# Patient Record
Sex: MALE | Race: WHITE | URBAN - NONMETROPOLITAN AREA
[De-identification: names, ages, dates, MRNs, and addresses within clinical notes are randomized per-mention and may not be internally consistent; named-entity substitution may affect disease eponyms.]

---

## 2022-05-11 ENCOUNTER — HOSPITAL ENCOUNTER (OUTPATIENT)
Dept: OCCUPATIONAL THERAPY | Age: 57
Setting detail: THERAPIES SERIES
Discharge: HOME | End: 2022-05-11
Payer: COMMERCIAL

## 2022-05-11 PROCEDURE — 97165 OT EVAL LOW COMPLEX 30 MIN: CPT

## 2022-05-11 ASSESSMENT — 9 HOLE PEG TEST
TESTTIME_SECONDS: 26.9
TESTTIME_SECONDS: 32.6

## 2022-05-11 NOTE — PLAN OF CARE
Phone: [address]         Fax: [phone]                       Outpatient Occupational Therapy                                                                         Plan of Care    Date: 2022  Patient: Samantha Yates  : 1965  ACCT #: [de-identified]    [facility]#: [id number]  Referring Provider (secondary): Jace Olszewski, MD    Diagnosis: Parkinson's Disease  Additional Pertinent Hx: Pt recently diagnosed with Parkinson's Disease. States his father passed away from Parkinson's. States noted difficulties with daily tasks secondary to \"shaking\". States shaking has improved with medications but his tremors still cause issues. States he also has recently been put on medication for high blood pressure with the thought that making tremors associated with high blood pressure. (pt on coumadin)  Treatment Diagnosis: Parkinson's Disease    Onset Date: 22  Total # of Visits Approved: 12 Per Physician Order  Total # of Visits to Date: 1  No Show: 0  Canceled Appointment: 0      Objective      Fine Motor Skills  Left 9 Hole Peg Test Time (secs): 32.6  Right 9 Hole Peg Test Time (secs): 26.9       LUE AROM (degrees)  LUE AROM : WFL  Left Hand AROM (degrees)  Left Hand AROM: WFL  RUE AROM (degrees)  RUE AROM : WFL  Right Hand AROM (degrees)  Right Hand AROM: WFL   LUE Strength  Gross LUE Strength: WFL  L Hand General: 4+/5  Left Hand Strength -  (lbs)  Handle Setting 2: 105, 104, 114  L Lat pinch: 18.3# average  L Brantley pinch: 18.3# average  L Tip pinch: 17.6 # average  RUE Strength  Gross RUE Strength: WFL  R Hand General: 4+/5  Right Hand Strength -  (lbs)  Handle Setting 2: 110, 114, 100  R Lat pinch: 21.5# average  R Brantley pinch: 21.5# average  R Tip Pinch: 18.67# average        Assessment  Performance deficits / Impairments: Decreased endurance  Assessment: Pt referred to outpt OT with diagnosis of early onset Parkinson's Disease.  Pt states he has no deficits regarding completion of self care tasks or work duties. Pt's only complaint are the LEONEL UE involuntary tremors that are worse when engaging FM activities such as signing name & writing out checks. 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 Pt demonstrates no LEONEL UB ROM or MMT deficits. Also no /pinch strength deficits identified as indicated above. Plan to see pt for a few sessions to provide education of modified equipment to reduce intensity of tremors during FM tasks.   Prognosis: Fair       PLAN     Plan  Plan Frequency: 1-2x  Plan Weeks: 8 visits    [x] HP/CP      [] Electrical Stimulation   [x]  Strengthening    [x]  Active/Passive ROM  [] Muscle Re-education    [x] Fine Motor Coordination    [x]  Ultrasound   [x]  Splinting  [] Developmental Therapy    [] Sensory Integration [x]  Patient Education/HEP [] Visual Perception Retraining   [x] ADL Training   [] Cognitive Retraining  [] Fluidotherapy  [x] Paraffin   [x] Therapeutic Exercise  [x] Therapeutic Activity  [] Other:    GOALS  Short Term Goals  Time Frame for Short term goals: STG=LTG         Long Term Goals  Time Frame for Long term goals : 8 visits  Long Term Goal 1: Pt to be educated on & trial modified writing utensils in order to engage handwriting tasks such as signing name/writing checks  Long Term Goal 2: Pt to be educated on AE/EC/WS strategies to minimze fatigue & decreased tremors throughout daily tasks  Long Term Goal 3: Pt to be educated on Parkinson's Disease progression and how to maintain independence as symptoms progress               [place] Dow, CLAUDINE, OTR/L                    Date: 5/11/2022      _____________________________________ Date: 5/11/2022  Physician Signature    By signing above or cosigning electronically, I have reviewed this Plan of Care and certify a need for medically necessary rehabilitation services.

## 2022-05-11 NOTE — PROGRESS NOTES
Phone: [address]          Fax: [phone]                       Outpatient Occupational Therapy                                                                            Evaluation    Date: 2022  Patient: Arsenio Severance  : 1965  ACCT#: [de-identified]   Referring Provider (secondary): Armin Morataya MD    Diagnosis: Parkinson's Disease   Additional Pertinent Hx: Pt recently diagnosed with Parkinson's Disease. States his father passed away from Parkinson's. States noted difficulties with daily tasks secondary to \"shaking\". States shaking has improved with medications but his tremors still cause issues.  States he also has recently been put on medication for high blood pressure with the thought that making tremors associated with high blood pressure. (pt on coumadin)  Treatment Diagnosis: Parkinson's Disease  Onset Date: 22  OT Insurance Information: [facility] [place]- 36 visits shared between OT/PT  Total # of Visits Approved: 12 Per Physician Order  Total # of Visits to Date: 1  No Show: 0  Canceled Appointment: 0           Subjective  Hand Dominance: Right  Social/Functional History  ADL Assistance: Independent  Homemaking Assistance: Independent  Homemaking Responsibilities: Yes  Ambulation Assistance: Independent  Transfer Assistance: Independent  Active : Yes  Occupation: [employer] time employment  Type of Occupation: [employer]; pt also farms full time  Leisure & Hobbies: farming  IADL Comments: feeds cattle daily- c/o most about the shaking  IADL History  Homemaking Responsibilities: Yes  Active : Yes  Occupation: [email or web address] time employment  Type of Occupation: [employer]; pt also farms full time  Leisure & Hobbies: farming  IADL Comments: feeds cattle daily- c/o most about the shaking  Prior Function  ADL Assistance: Independent  Homemaking Assistance: Independent  Ambulation Assistance: Independent  Transfer Assistance: Independent  Learning  Does the patient/guardian have any barriers to learning?: No barriers  What is the preferred language of the patient/guardian?: English  Is an  required?: No    Objective         Fine Motor Skills  Left 9 Hole Peg Test Time (secs): 32.6  Right 9 Hole Peg Test Time (secs): 26.9          LUE AROM (degrees)  LUE AROM : WFL  Left Hand AROM (degrees)  Left Hand AROM: WFL  RUE AROM (degrees)  RUE AROM : WFL  Right Hand AROM (degrees)  Right Hand AROM: WFL   LUE Strength  Gross LUE Strength: WFL  L Hand General: 4+/5  Left Hand Strength -  (lbs)  Handle Setting 2: 105, 104, 114  L Lat pinch: 18.3# average  L Brantley pinch: 18.3# average  L Tip pinch: 17.6 # average  RUE Strength  Gross RUE Strength: WFL  R Hand General: 4+/5  Right Hand Strength -  (lbs)  Handle Setting 2: 110, 114, 100  R Lat pinch: 21.5# average  R Brantley pinch: 21.5# average  R Tip Pinch: 18.67# average     Assessment  Performance deficits / Impairments: Decreased endurance  Assessment: Pt referred to outpt OT with diagnosis of early onset Parkinson's Disease. Pt states he has no deficits regarding completion of self care tasks or work duties. Pt's only complaint are the LEONEL UE involuntary tremors that are worse when engaging FM activities such as signing name & writing out checks. 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 Pt demonstrates no LEONEL UB ROM or MMT deficits. Also no /pinch strength deficits identified as indicated above. Plan to see pt for a few sessions to provide education of modified equipment to reduce intensity of tremors during FM tasks.   Prognosis: Fair      Patient's Goal:  Pt stats he has no real concerns regarding ability to care for self or complete farm duties- only complaint is his recent tremors/shaking    Short Term Goals  Time Frame for Short term goals: STG=LTG        Long Term Goals  Time Frame for Long term goals : 8 visits  Long Term Goal 1: Pt to be educated on & trial modified writing utensils in order to engage handwriting tasks such as signing name/writing checks  Long Term Goal 2: Pt to be educated on AE/EC/WS strategies to minimze fatigue & decreased tremors throughout daily tasks  Long Term Goal 3: Pt to be educated on Parkinson's Disease progression and how to maintain independence as symptoms progress           Time In: 845  Time Out: 935  Timed Coded Minutes: 0  Total Treatment Time: 50    Shobha Dow, CLAUDINE, OTR/L  5/11/2022

## 2022-05-12 ENCOUNTER — HOSPITAL ENCOUNTER (OUTPATIENT)
Dept: SPEECH THERAPY | Age: 57
Setting detail: THERAPIES SERIES
Discharge: HOME | End: 2022-05-12
Payer: COMMERCIAL

## 2022-05-12 ENCOUNTER — HOSPITAL ENCOUNTER (OUTPATIENT)
Dept: PHYSICAL THERAPY | Age: 57
Setting detail: THERAPIES SERIES
Discharge: HOME | End: 2022-05-12
Payer: COMMERCIAL

## 2022-05-12 PROCEDURE — 97161 PT EVAL LOW COMPLEX 20 MIN: CPT

## 2022-05-12 PROCEDURE — 92523 SPEECH SOUND LANG COMPREHEN: CPT

## 2022-05-12 NOTE — PROGRESS NOTES
Speech Language Pathology  Facility/Department: [facility] SPEECH THERAPY  Initial Assessment    NAME: Anne-Marie De La Cruz  : 1965  MRN: [id number]    Date of Eval: 2022  Evaluating Therapist: Lindsay Escudero M.S., CCC-SLP      Past Medical History: Parkinsons Disease  Past Surgical History:  has no past surgical history on file. Primary Complaint: Pt recently diagnosed with Parkinson's Disease. No issues reported with speech or language. Pt does report occasionally having trouble recalling things or knowing the current date. Pain: 0/10    Assessment:  Diagnosis: Mild Cognitive Impairment (G31.84)    Subjective:   Previous level of function and limitations: Pt is independent with medication management, financial management, work, cooking, ADL's, and driving. General  Chart Reviewed: Yes  Patient assessed for rehabilitation services?: Yes  Family / Caregiver Present: No  General Comment  Comments: 1 (Total # of visits)  Visit Information  Total # of Visits Approved: 20  Total # of Visits to Date: 1  No Show: 0  Canceled Appointment: 0     Vision  Vision: Impaired  Vision Exceptions: Wears glasses for reading  Hearing  Hearing: Within functional limits       Objective:  Oral/Motor: Labial and lingual ROM and strength appear WFL. Pt with adequate natural dentition. Motor Speech  Pt with speech that is 100% intelligible. No dysarthria or apraxia observed. Receptive Language  Pt with receptive language WFL. Expressive Language  Pt with expressive language WFL. Cognition  Pt with mild deficits noted in working memory and short term memory. Pt reports independent successful use of external compensatory strategies for memory, including alarms, calendar, taking notes/photos of important information. Attention, problem solving, judgement, calculation, convergent/divergent thinking, planning, and reasoning all appear WFL.     Patient tolerated todays evaluation: Good    Treatment Given Today: [x] Evaluation    []Plans/ Goals discussed with pt/family/caregiver(s)                                        [] Risks Benefits discussed with pt/family/caregiver(s)    IMPRESSIONS:  Christian Escobar was seen for a speech, language, and cognitive evaluation this date at the request of his neurologist, due to a recent diagnosis of Parkinson's Disease. He denies any difficulties with speech, language, or swallowing currently. An informal assessment of speech, language, and cognitive linguistic skills was completed this date. Pt presents with expressive and receptive language, motor speech, and cognitive-linguistic skills that are within functional limits. ST is not indicated at this time. Educated pt on possible disease progression and difficulties that may arise in the future with voice and swallowing due to Parkinson's. Pt encouraged to return if any of these difficulties occur in the future. RECOMMENDATIONS:  [] Patient to be seen by ST                                                                       [x] ST not warranted at this time. [] A re-evaluation is recommended in ___ months. The results of these tests and the recommendations were explained to Christian Escobar, and he appeared to understand the information presented. Thank you for this referral.  If you have any further questions, you can reach me at [phone].     Additional Comments:     TIME   Time Evaluation session was INITIATED 1330   Time Evaluation session was STOPPED 1430    60 MINUTES     Units Charged: 1    Electronically signed by:  Villa Hunt M.S., CCC-SLP           Date:5/12/2022

## 2022-05-13 NOTE — PLAN OF CARE
[facility] NENA PANIAGUA       Phone: [phone]   Date: 2022                      Outpatient Physical Therapy  Fax: [phone]    ACCT #: [de-identified]                     Plan of Care  [facility]#: [id number]  Patient: Opal Raman  : 1965    Referring Provider (secondary): Danna Baeza MD    Referral Date: 22      Diagnosis: Parkinson's  Onset Date: 22  Treatment Diagnosis: Parkinsons    Assessment: Patient presents with early stage Parkinsons which currently is affecting primarily his writing and fine motor tasks due to tremors. He exhibits full ROM and normal strength of his extremities and no LOB or gait difficulties other than decreased arm swing. 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 Discussed with patient completing large amplitude movements as well as signs of disease progression however at this time he does not warrent addtional out patient PT. If symptoms progress, patient to contact this department. Therapy Prognosis: Good              Goals  Time Frame for Short term goals: 1 visit  Short term goal 1: Assess patient for treatment and/or recommendations for home program - MET        MARK BROWN, PT   Date: 2022    ______________________________________ Date: 2022  Physician Signature  By signing above or cosigning electronically, I have reviewed this Plan of Care and certify a need for medically necessary rehabilitation services.

## 2022-05-13 NOTE — PROGRESS NOTES
Phone: [phone] [place]         Fax: [phone]                      Outpatient Physical Therapy                                                                      Evaluation    Date: 2022  Patient: Queenie Bell  : 1965  ACCT #: [de-identified]    Referring Practitioner:Referring Provider (secondary): Stephanie Burrell MD    Referral Date: 22      Diagnosis: Parkinson's    Treatment Diagnosis: Parkinsons  Onset Date: 22  PT Insurance Information: MM  Total # of Visits Approved: 40 Per Physician Order  Total # of Visits to Date: 1  No Show: 0  Canceled Appointment: 0     Subjective     Additional Pertinent Hx: Patient diagnosed 3 weeks ago with early stage Parkinsons after being seen by neurologist.   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 He is very knowledgable regarding the disease as he recently lost his father to Parkinsons. 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   PMHx otherwise unremarkable             Objective  Vision  Vision: Within Functional Limits  Hearing  Hearing: Within functional limits  Observation/Palpation  Posture: Fair     Strength RLE  Strength RLE: WNL  Comment: 5/5 hip flexio and knee extension  AROM RLE (degrees)  RLE AROM: WNL  Strength LLE  Strength LLE: WNL  Comment: 5/5 hip flexio and knee extension  AROM LLE (degrees)  LLE AROM : WNL  Strength RUE  Strength RUE: WNL  Strength LUE  Strength LUE: WNL    AROM RUE (degrees)  RUE AROM : WNL  AROM LUE (degrees)  LUE AROM : WNL  AROM RLE (degrees)  RLE AROM: WNL  AROM LLE (degrees)  LLE AROM : WNL              Additional Measures  Special Tests: TUG < 13 sec without device;  able to complete alt marches without UE assist  Other: Guzman normal;  Tandem stance and SLS > 10 sec Assessment  Assessment: Patient presents with early stage Parkinsons which currently is affecting primarily his writing and fine motor tasks due to tremors. He exhibits full ROM and normal strength of his extremities and no LOB or gait difficulties other than decreased arm swing. 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 Discussed with patient completing large amplitude movements as well as signs of disease progression however at this time he does not warrent addtional out patient PT.   Therapy Prognosis: Good    Clinical Presentation:  Stable/Uncomplicated  The Following Comorbities will impact the patients progression and Plan of Care:   unremarkable          Low Complexity    Education: Discussed signs of disease progression and value/need for big amplitude movements        Learning  Does the patient/guardian have any barriers to learning?: No barriers  Will there be a co-learner?: No  What is the preferred language of the patient/guardian?: English    Goals  Short Term Goals  Time Frame for Short term goals: 1 visit  Short term goal 1: Assess patient for treatment and/or recommendations for home program - MET         Patient's Goal:    Continue to move    Timed Code Treatment Minutes: 0 Minutes  Total Treatment Time: 50     Time In: 5805  Time Out: 9930 062 [facility], PT Date: 5/12/2022

## 2022-05-17 NOTE — DISCHARGE SUMMARY
Phone: [address]    Fax: [phone]                       Outpatient Occupational Therapy                                                                    Discharge Summary    Patient: Viki Ny  : 1965  ACCT #: [de-identified]   Referring Provider (secondary): Gemma Palomares MD     Diagnosis: Parkinson's Disease    Date Treatment Initiated: 2022  Date of Last Treatment: 2022  Frequency:  1 times/wk  Duration:  1 visit (evaluation only)    Onset Date: 22  Total # of Visits Approved: 12 Per Physician Order  Total # of Visits to Date: 1  No Show: 0  Canceled Appointment: 0  Current Treatment:  [x] HP/CP     [] Electrical Stimulation   [x]  Strengthening    [x]  Active/Passive ROM  [] Muscle Re-education    [x] Fine Motor Coordination    [x]  Ultrasound   [x]  Splinting  [] Developmental Therapy    [] Sensory Integration [x]  Patient Education/HEP [] Visual Perception Retraining   [x] ADL Training   [] Cognitive Retraining  [] Fluidotherapy  [x] Paraffin   [x] Therapeutic Exercise  [x] Therapeutic Activity  [] Other:    Objective  Fine Motor Skills  Left 9 Hole Peg Test Time (secs): 32.6  Right 9 Hole Peg Test Time (secs): 26.9       LUE AROM (degrees)  LUE AROM : WFL  Left Hand AROM (degrees)  Left Hand AROM: WFL  RUE AROM (degrees)  RUE AROM : WFL  Right Hand AROM (degrees)  Right Hand AROM: WFL   LUE Strength  Gross LUE Strength: WFL  L Hand General: 4+/5  Left Hand Strength -  (lbs)  Handle Setting 2: 105, 104, 114  L Lat pinch: 18.3# average  L Brantley pinch: 18.3# average  L Tip pinch: 17.6 # average  RUE Strength  Gross RUE Strength: WFL  R Hand General: 4+/5  Right Hand Strength -  (lbs)  Handle Setting 2: 110, 114, 100  R Lat pinch: 21.5# average  R Brantley pinch: 21.5# average  R Tip Pinch: 18.67# average           Assessment  Assessment: Pt seen for evaluation only.  Pt initiall referred to outpt OT with diagnosis of early onset Parkinson's Disease. Per discussion w/ pt, no deficits regarding completion of self care tasks or work duties. Pt's only complaint are the LEONEL UE involuntary tremors that are worse when engaging FM activities such as signing name & writing out checks. 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 Pt demonstrates no LEONEL UB ROM or MMT deficits. Also no /pinch strength deficits identified as indicated above. Discussed additional OT visits for education on AE & progression of disease, however both pt & therapist agree that pt's symptoms do not currently impact his ability to engage in functional tasks. Pt was briefly educated at evaluation on adaptive weighted equipment to use during FM tasks to manage tremors. Plan d/c pt at this time. Pt understands that if his symptoms become worse he can return to outpt therapy with new referral if he'd like. Prognosis: [address]         Reason for Discharge  [] Poor Follow Through [] Completion of Prescribed Sessions    []  Optimal Function Achieved   [x]  Patient Discharged Self  [x] Seen for evaluation only    Comments:   Thank you for this referral      [place]in[place] Dow, CLAUDINE, OTR/L    Date: 5/17/2022

## 2022-12-29 ENCOUNTER — HOSPITAL ENCOUNTER (OUTPATIENT)
Dept: GENERAL RADIOLOGY | Age: 57
Discharge: HOME | End: 2022-12-31
Payer: COMMERCIAL

## 2022-12-29 ENCOUNTER — HOSPITAL ENCOUNTER (OUTPATIENT)
Age: 57
Discharge: HOME | End: 2022-12-31
Payer: COMMERCIAL

## 2022-12-29 DIAGNOSIS — J45.41 MODERATE PERSISTENT ASTHMA WITH (ACUTE) EXACERBATION: ICD-10-CM

## 2022-12-29 PROCEDURE — 71046 X-RAY EXAM CHEST 2 VIEWS: CPT
